# Patient Record
Sex: MALE | Race: WHITE | URBAN - METROPOLITAN AREA
[De-identification: names, ages, dates, MRNs, and addresses within clinical notes are randomized per-mention and may not be internally consistent; named-entity substitution may affect disease eponyms.]

---

## 2019-04-04 ENCOUNTER — OFFICE VISIT (OUTPATIENT)
Dept: URBAN - METROPOLITAN AREA CLINIC 17 | Facility: CLINIC | Age: 79
End: 2019-04-04
Payer: MEDICARE

## 2019-04-04 DIAGNOSIS — H25.13 AGE-RELATED NUCLEAR CATARACT, BILATERAL: Primary | ICD-10-CM

## 2019-04-04 PROCEDURE — 92004 COMPRE OPH EXAM NEW PT 1/>: CPT | Performed by: OPHTHALMOLOGY

## 2019-04-04 RX ORDER — DUREZOL 0.5 MG/ML
0.05 % EMULSION OPHTHALMIC
Qty: 1 | Refills: 1 | Status: ACTIVE
Start: 2019-04-04

## 2019-04-04 RX ORDER — OFLOXACIN 3 MG/ML
0.3 % SOLUTION/ DROPS OPHTHALMIC
Qty: 1 | Refills: 1 | Status: ACTIVE
Start: 2019-04-04

## 2019-04-04 ASSESSMENT — INTRAOCULAR PRESSURE
OS: 13
OD: 14

## 2019-04-04 ASSESSMENT — KERATOMETRY
OD: 44.38
OS: 44.13

## 2019-04-04 ASSESSMENT — VISUAL ACUITY
OD: 20/40
OS: 20/50

## 2019-04-04 NOTE — IMPRESSION/PLAN
Impression: Age-related nuclear cataract, bilateral: H25.13 (OD>OS). Vision: vision affected. Symptoms: could improve with surgery. Plan: Cataract accounts for patient's complaints. Reviewed risks, benefits, and procedure. Patient desires surgery, schedule ce/iol OD then OS, RL2, discussed lens options, distance refractive target, patient is clear for surgery in [place] [id number].

## 2019-04-16 ENCOUNTER — TESTING ONLY (OUTPATIENT)
Dept: URBAN - METROPOLITAN AREA CLINIC 17 | Facility: CLINIC | Age: 79
End: 2019-04-16
Payer: MEDICARE

## 2019-04-16 ASSESSMENT — PACHYMETRY
OD: 2.63
OD: 21.58
OS: 21.23
OS: 2.60

## 2019-05-21 ENCOUNTER — SURGERY (OUTPATIENT)
Dept: URBAN - METROPOLITAN AREA SURGERY 7 | Facility: SURGERY | Age: 79
End: 2019-05-21
Payer: MEDICARE

## 2019-05-22 ENCOUNTER — POST-OPERATIVE VISIT (OUTPATIENT)
Dept: URBAN - METROPOLITAN AREA CLINIC 17 | Facility: CLINIC | Age: 79
End: 2019-05-22

## 2019-05-22 DIAGNOSIS — Z09 ENCNTR FOR F/U EXAM AFT TRTMT FOR COND OTH THAN MALIG NEOPLM: Primary | ICD-10-CM

## 2019-05-22 PROCEDURE — 99024 POSTOP FOLLOW-UP VISIT: CPT | Performed by: OPTOMETRIST

## 2019-05-22 ASSESSMENT — INTRAOCULAR PRESSURE
OD: 18
OS: 9

## 2019-06-06 ENCOUNTER — SURGERY (OUTPATIENT)
Dept: URBAN - METROPOLITAN AREA SURGERY 7 | Facility: SURGERY | Age: 79
End: 2019-06-06
Payer: MEDICARE

## 2019-06-06 PROCEDURE — 66984 XCAPSL CTRC RMVL W/O ECP: CPT | Performed by: OPHTHALMOLOGY

## 2019-06-07 ENCOUNTER — POST-OPERATIVE VISIT (OUTPATIENT)
Dept: URBAN - METROPOLITAN AREA CLINIC 17 | Facility: CLINIC | Age: 79
End: 2019-06-07

## 2019-06-07 PROCEDURE — 99024 POSTOP FOLLOW-UP VISIT: CPT | Performed by: OPTOMETRIST

## 2019-06-07 ASSESSMENT — INTRAOCULAR PRESSURE
OD: 11
OS: 20